# Patient Record
Sex: FEMALE | Race: WHITE | NOT HISPANIC OR LATINO | Employment: OTHER | RURAL
[De-identification: names, ages, dates, MRNs, and addresses within clinical notes are randomized per-mention and may not be internally consistent; named-entity substitution may affect disease eponyms.]

---

## 2021-10-13 ENCOUNTER — CLINICAL SUPPORT (OUTPATIENT)
Dept: FAMILY MEDICINE | Facility: CLINIC | Age: 66
End: 2021-10-13
Payer: MEDICARE

## 2021-10-13 DIAGNOSIS — Z23 IMMUNIZATION DUE: Primary | ICD-10-CM

## 2021-10-13 PROCEDURE — G0008 ADMIN INFLUENZA VIRUS VAC: HCPCS | Mod: ,,, | Performed by: NURSE PRACTITIONER

## 2021-10-13 PROCEDURE — 90662 FLU VACCINE - QUADRIVALENT - HIGH DOSE (65+) PRESERVATIVE FREE IM: ICD-10-PCS | Mod: ,,, | Performed by: NURSE PRACTITIONER

## 2021-10-13 PROCEDURE — 90662 IIV NO PRSV INCREASED AG IM: CPT | Mod: ,,, | Performed by: NURSE PRACTITIONER

## 2021-10-13 PROCEDURE — G0008 FLU VACCINE - QUADRIVALENT - HIGH DOSE (65+) PRESERVATIVE FREE IM: ICD-10-PCS | Mod: ,,, | Performed by: NURSE PRACTITIONER

## 2023-01-31 ENCOUNTER — OFFICE VISIT (OUTPATIENT)
Dept: FAMILY MEDICINE | Facility: CLINIC | Age: 68
End: 2023-01-31
Payer: MEDICARE

## 2023-01-31 VITALS — HEART RATE: 65 BPM | SYSTOLIC BLOOD PRESSURE: 168 MMHG | DIASTOLIC BLOOD PRESSURE: 76 MMHG

## 2023-01-31 DIAGNOSIS — I10 PRIMARY HYPERTENSION: Primary | Chronic | ICD-10-CM

## 2023-01-31 DIAGNOSIS — E78.2 MIXED HYPERLIPIDEMIA: Chronic | ICD-10-CM

## 2023-01-31 PROCEDURE — 99204 PR OFFICE/OUTPT VISIT, NEW, LEVL IV, 45-59 MIN: ICD-10-PCS | Mod: ,,, | Performed by: FAMILY MEDICINE

## 2023-01-31 PROCEDURE — 99204 OFFICE O/P NEW MOD 45 MIN: CPT | Mod: ,,, | Performed by: FAMILY MEDICINE

## 2023-01-31 RX ORDER — ATENOLOL 100 MG/1
100 TABLET ORAL
COMMUNITY
Start: 2023-01-02 | End: 2023-01-31 | Stop reason: SDUPTHER

## 2023-01-31 RX ORDER — SIMVASTATIN 80 MG/1
80 TABLET, FILM COATED ORAL NIGHTLY
Qty: 30 TABLET | Refills: 2 | Status: SHIPPED | OUTPATIENT
Start: 2023-01-31 | End: 2023-04-21 | Stop reason: SDUPTHER

## 2023-01-31 RX ORDER — LISINOPRIL 10 MG/1
10 TABLET ORAL DAILY
Qty: 30 TABLET | Refills: 2 | Status: SHIPPED | OUTPATIENT
Start: 2023-01-31 | End: 2023-02-28 | Stop reason: SDUPTHER

## 2023-01-31 RX ORDER — ATENOLOL 100 MG/1
100 TABLET ORAL DAILY
Qty: 30 TABLET | Refills: 2 | Status: SHIPPED | OUTPATIENT
Start: 2023-01-31 | End: 2023-02-28 | Stop reason: SDUPTHER

## 2023-01-31 RX ORDER — LISINOPRIL 10 MG/1
10 TABLET ORAL
COMMUNITY
Start: 2023-01-02 | End: 2023-01-31 | Stop reason: SDUPTHER

## 2023-01-31 RX ORDER — SIMVASTATIN 80 MG/1
80 TABLET, FILM COATED ORAL
COMMUNITY
Start: 2023-01-02 | End: 2023-01-31 | Stop reason: SDUPTHER

## 2023-01-31 NOTE — PROGRESS NOTES
Christopher Kelly MD   [facility]  MEDICAL GROUP [facility] - FAMILY MEDICINE  [address] [place] [id number]  [phone]      PATIENT NAME: Kathy Mansfield  : 1955  DATE: 23  MRN: [id number]      Billing Provider: Christopher Kelly MD  Level of Service:   Patient PCP Information       Provider PCP Type    Christopher Kelly MD General            Reason for Visit / Chief Complaint: Establish Care       Update PCP  Update Chief Complaint         History of Present Illness / Problem Focused Workflow     Kathy Mansfield presents to the clinic with Establish Care       68 yo WF here for follow up HTN and hyperlipidemia.  Says that she is doing well and has no c/o ROS today.  She does need meds refilled.  Was seeing Dr. Medel for management of blood pressure and lipids but he has recently retired.  She does moniotr BP at home and says it has been 130s/60s.  Reports that she has whitecoat syndrome.    Review of Systems     Review of Systems   Constitutional:  Negative for activity change, chills and fever.   HENT:  Negative for sore throat.    Eyes:  Negative for pain.   Respiratory:  Negative for cough, chest tightness and shortness of breath.    Cardiovascular:  Negative for chest pain and palpitations.   Gastrointestinal:  Negative for abdominal pain.   Neurological:  Negative for dizziness, syncope and weakness.   Psychiatric/Behavioral:  Negative for confusion.       Medical / Social / Family History   History reviewed. No pertinent past medical history.    History reviewed. No pertinent surgical history.    Social History            Family History  History reviewed. No pertinent family history.    Medications and Allergies     Medications  Outpatient Medications Marked as Taking for the 23 encounter (Office Visit) with Christopher Kelly MD   Medication Sig Dispense Refill    [DISCONTINUED] atenoloL (TENORMIN) 100 MG tablet Take 100 mg by mouth.       [DISCONTINUED] lisinopriL 10 MG tablet Take 10 mg by mouth.      [DISCONTINUED] simvastatin (ZOCOR) 80 MG tablet Take 80 mg by mouth.         Allergies  Review of patient's allergies indicates:  No Known Allergies    Physical Examination     Vitals:    01/31/23 1115   BP: (!) 168/76   Pulse: 65     Physical Exam  Vitals reviewed.   Constitutional:       Appearance: Normal appearance. She is normal weight.   HENT:      Head: Normocephalic and atraumatic.   Eyes:      Extraocular Movements: Extraocular movements intact.      Conjunctiva/sclera: Conjunctivae normal.      Pupils: Pupils are equal, round, and reactive to light.   Cardiovascular:      Rate and Rhythm: Normal rate and regular rhythm.      Heart sounds: Normal heart sounds.   Pulmonary:      Effort: Pulmonary effort is normal.      Breath sounds: Normal breath sounds.   Musculoskeletal:         General: Normal range of motion.      Cervical back: Normal range of motion.   Skin:     General: Skin is warm and dry.   Neurological:      General: No focal deficit present.      Mental Status: She is alert and oriented to person, place, and time.   Psychiatric:         Mood and Affect: Mood normal.         Behavior: Behavior normal.        Assessment and Plan (including Health Maintenance)      Problem List  Smart Sets  Document Outside HM   :    Plan: she is not due for labs until late March.  She will moniotr BP at home and has virtual nursing visit and MD clinic visits scheduled.          Health Maintenance Due   Topic Date Due    Hepatitis C Screening  Never done    Lipid Panel  Never done    COVID-19 Vaccine (1) Never done    TETANUS VACCINE  Never done    Mammogram  Never done    DEXA Scan  Never done    Colorectal Cancer Screening  Never done    Shingles Vaccine (1 of 2) Never done    Pneumococcal Vaccines (Age 65+) (1 - PCV) Never done    Influenza Vaccine (1) 09/01/2022       Problem List Items Addressed This Visit    None  Visit Diagnoses       Primary  hypertension    -  Primary    Relevant Medications    atenoloL (TENORMIN) 100 MG tablet    lisinopriL 10 MG tablet    Mixed hyperlipidemia        Relevant Medications    simvastatin (ZOCOR) 80 MG tablet            The patient has no Health Maintenance topics of status Not Due    Future Appointments   Date Time Provider Department Center   2/13/2023 11:00 AM [address] [facility] FAMILY MED[facility]   2/28/2023  9:15 AM Christopher Kelly MD Located within [facility]            Signature:  MD GHAZAL Robb [facility]  MEDICAL GROUP [facility] FAMILY MEDICINE  [address] [place] [address] [place] [address]  [phone]    Date of encounter: 1/31/23

## 2023-02-13 ENCOUNTER — CLINICAL SUPPORT (OUTPATIENT)
Dept: FAMILY MEDICINE | Facility: CLINIC | Age: 68
End: 2023-02-13
Payer: MEDICARE

## 2023-02-13 VITALS — DIASTOLIC BLOOD PRESSURE: 62 MMHG | SYSTOLIC BLOOD PRESSURE: 139 MMHG

## 2023-02-13 DIAGNOSIS — I10 PRIMARY HYPERTENSION: Primary | ICD-10-CM

## 2023-02-28 ENCOUNTER — OFFICE VISIT (OUTPATIENT)
Dept: FAMILY MEDICINE | Facility: CLINIC | Age: 68
End: 2023-02-28
Payer: MEDICARE

## 2023-02-28 VITALS
HEART RATE: 66 BPM | SYSTOLIC BLOOD PRESSURE: 167 MMHG | BODY MASS INDEX: 26.46 KG/M2 | WEIGHT: 155 LBS | HEIGHT: 64 IN | DIASTOLIC BLOOD PRESSURE: 76 MMHG

## 2023-02-28 DIAGNOSIS — I10 PRIMARY HYPERTENSION: Primary | Chronic | ICD-10-CM

## 2023-02-28 DIAGNOSIS — E78.2 MIXED HYPERLIPIDEMIA: Chronic | ICD-10-CM

## 2023-02-28 PROCEDURE — 99214 OFFICE O/P EST MOD 30 MIN: CPT | Mod: ,,, | Performed by: FAMILY MEDICINE

## 2023-02-28 PROCEDURE — 99214 PR OFFICE/OUTPT VISIT, EST, LEVL IV, 30-39 MIN: ICD-10-PCS | Mod: ,,, | Performed by: FAMILY MEDICINE

## 2023-02-28 RX ORDER — ATENOLOL 100 MG/1
100 TABLET ORAL DAILY
Qty: 30 TABLET | Refills: 1
Start: 2023-02-28 | End: 2023-04-21 | Stop reason: SDUPTHER

## 2023-02-28 RX ORDER — ASPIRIN 325 MG
325 TABLET ORAL DAILY
COMMUNITY

## 2023-02-28 RX ORDER — LISINOPRIL 10 MG/1
10 TABLET ORAL DAILY
Qty: 30 TABLET | Refills: 1
Start: 2023-02-28 | End: 2023-04-21 | Stop reason: SDUPTHER

## 2023-02-28 NOTE — PROGRESS NOTES
"   Christopher Kelly MD   [facility]  MEDICAL GROUP [facility] FAMILY MEDICINE  [address] [place] [address]  [phone]      PATIENT NAME: Kathy Mansfield  : 1955  DATE: 23  MRN: [id number]      Billing Provider: Christopher Kelly MD  Level of Service:   Patient PCP Information       Provider PCP Type    Christopher Kelly MD General            Reason for Visit / Chief Complaint: Follow-up (1 Month Follow-up HTN)       Update PCP  Update Chief Complaint         History of Present Illness / Problem Focused Workflow     Kathy Mansfield presents to the clinic with Follow-up (1 Month Follow-up HTN)       66 yo WF here for one month follow up HTN.  She is compliant with meds and monitors at home and is normotensive.  Is frequently elevated in office.  Pt reports having "white coat syndrome."  No new/acute complaints.  Also on med for lipids.  Needs labs done next visit.    Review of Systems     Review of Systems   Constitutional:  Negative for activity change, chills and fever.   HENT:  Negative for sore throat.    Eyes:  Negative for pain.   Respiratory:  Negative for cough, chest tightness and shortness of breath.    Cardiovascular:  Negative for chest pain and palpitations.   Gastrointestinal:  Negative for abdominal pain.   Neurological:  Negative for dizziness, syncope and weakness.   Psychiatric/Behavioral:  Negative for confusion.       Medical / Social / Family History   History reviewed. No pertinent past medical history.    History reviewed. No pertinent surgical history.    Social History    reports that she has never smoked. She has never been exposed to tobacco smoke. She has never used smokeless tobacco.   Social History     Tobacco Use    Smoking status: Never     Passive exposure: Never    Smokeless tobacco: Never       Family History  History reviewed. No pertinent family history.    Medications and Allergies     Medications  Outpatient Medications " Marked as Taking for the 2/28/23 encounter (Office Visit) with Christopher Kelly MD   Medication Sig Dispense Refill    aspirin 325 MG tablet Take 325 mg by mouth once daily.      simvastatin (ZOCOR) 80 MG tablet Take 1 tablet (80 mg total) by mouth nightly. 30 tablet 2    [DISCONTINUED] atenoloL (TENORMIN) 100 MG tablet Take 1 tablet (100 mg total) by mouth once daily. 30 tablet 2    [DISCONTINUED] lisinopriL 10 MG tablet Take 1 tablet (10 mg total) by mouth once daily. 30 tablet 2       Allergies  Review of patient's allergies indicates:  No Known Allergies    Physical Examination     Vitals:    02/28/23 0916   BP: (!) 167/76   Pulse: 66     Physical Exam  Vitals reviewed.   Constitutional:       Appearance: Normal appearance.   HENT:      Head: Normocephalic and atraumatic.   Eyes:      Extraocular Movements: Extraocular movements intact.      Conjunctiva/sclera: Conjunctivae normal.      Pupils: Pupils are equal, round, and reactive to light.   Cardiovascular:      Rate and Rhythm: Normal rate and regular rhythm.      Heart sounds: Normal heart sounds.   Pulmonary:      Effort: Pulmonary effort is normal.      Breath sounds: Normal breath sounds.   Musculoskeletal:         General: Normal range of motion.      Cervical back: Normal range of motion.   Skin:     General: Skin is warm and dry.   Neurological:      General: No focal deficit present.      Mental Status: She is alert and oriented to person, place, and time.   Psychiatric:         Mood and Affect: Mood normal.         Behavior: Behavior normal.        Assessment and Plan (including Health Maintenance)      Problem List  Smart Sets  Document Outside HM   :    Plan: labs at next visit- CBC, CMP and lipid panel        Health Maintenance Due   Topic Date Due    Hepatitis C Screening  Never done    Lipid Panel  Never done    COVID-19 Vaccine (1) Never done    TETANUS VACCINE  Never done    Mammogram  Never done    Hemoglobin A1c (Diabetic Prevention  Screening)  Never done    DEXA Scan  Never done    Colorectal Cancer Screening  Never done    Shingles Vaccine (1 of 2) Never done    Pneumococcal Vaccines (Age 65+) (1 - PCV) Never done    Influenza Vaccine (1) 09/01/2022       Problem List Items Addressed This Visit    None  Visit Diagnoses       Primary hypertension  (Chronic)   -  Primary    Relevant Medications    lisinopriL 10 MG tablet    atenoloL (TENORMIN) 100 MG tablet    Mixed hyperlipidemia  (Chronic)               The patient has no Health Maintenance topics of status Not Due    No future appointments.         Signature:  MD GHAZAL Robb [facility]  MEDICAL GROUP [facility] - FAMILY MEDICINE  [address] [place] [address] [place] [address]  [phone]    Date of encounter: 2/28/23

## 2023-04-21 DIAGNOSIS — E78.2 MIXED HYPERLIPIDEMIA: Chronic | ICD-10-CM

## 2023-04-21 DIAGNOSIS — I10 PRIMARY HYPERTENSION: Chronic | ICD-10-CM

## 2023-04-21 RX ORDER — ATENOLOL 100 MG/1
100 TABLET ORAL DAILY
Qty: 30 TABLET | Refills: 2 | Status: SHIPPED | OUTPATIENT
Start: 2023-04-21 | End: 2023-08-23 | Stop reason: SDUPTHER

## 2023-04-21 RX ORDER — LISINOPRIL 10 MG/1
10 TABLET ORAL DAILY
Qty: 30 TABLET | Refills: 2 | Status: SHIPPED | OUTPATIENT
Start: 2023-04-21 | End: 2023-08-23 | Stop reason: SDUPTHER

## 2023-04-21 RX ORDER — SIMVASTATIN 80 MG/1
80 TABLET, FILM COATED ORAL NIGHTLY
Qty: 30 TABLET | Refills: 2 | Status: SHIPPED | OUTPATIENT
Start: 2023-04-21 | End: 2023-08-23 | Stop reason: SDUPTHER

## 2023-08-23 ENCOUNTER — OFFICE VISIT (OUTPATIENT)
Dept: FAMILY MEDICINE | Facility: CLINIC | Age: 68
End: 2023-08-23
Payer: MEDICARE

## 2023-08-23 VITALS
WEIGHT: 164.31 LBS | DIASTOLIC BLOOD PRESSURE: 78 MMHG | BODY MASS INDEX: 28.05 KG/M2 | HEART RATE: 80 BPM | SYSTOLIC BLOOD PRESSURE: 138 MMHG | HEIGHT: 64 IN

## 2023-08-23 DIAGNOSIS — I10 PRIMARY HYPERTENSION: Chronic | ICD-10-CM

## 2023-08-23 DIAGNOSIS — Z11.59 NEED FOR HEPATITIS C SCREENING TEST: Primary | ICD-10-CM

## 2023-08-23 DIAGNOSIS — Z13.1 SCREENING FOR DIABETES MELLITUS: ICD-10-CM

## 2023-08-23 DIAGNOSIS — E78.2 MIXED HYPERLIPIDEMIA: Chronic | ICD-10-CM

## 2023-08-23 LAB
CHOLEST SERPL-MCNC: 195 MG/DL (ref 0–200)
CHOLEST/HDLC SERPL: 4.3 {RATIO}
HCV AB SER QL: NORMAL
HDLC SERPL-MCNC: 45 MG/DL (ref 40–60)
LDLC SERPL CALC-MCNC: 103 MG/DL
LDLC/HDLC SERPL: 2.3 {RATIO}
NONHDLC SERPL-MCNC: 150 MG/DL
TRIGL SERPL-MCNC: 234 MG/DL (ref 35–150)
VLDLC SERPL-MCNC: 47 MG/DL

## 2023-08-23 PROCEDURE — 80061 LIPID PANEL: ICD-10-PCS | Mod: ,,, | Performed by: CLINICAL MEDICAL LABORATORY

## 2023-08-23 PROCEDURE — 99214 OFFICE O/P EST MOD 30 MIN: CPT | Mod: ,,, | Performed by: FAMILY MEDICINE

## 2023-08-23 PROCEDURE — 86803 HEPATITIS C AB TEST: CPT | Mod: ,,, | Performed by: CLINICAL MEDICAL LABORATORY

## 2023-08-23 PROCEDURE — 99214 PR OFFICE/OUTPT VISIT, EST, LEVL IV, 30-39 MIN: ICD-10-PCS | Mod: ,,, | Performed by: FAMILY MEDICINE

## 2023-08-23 PROCEDURE — 86803 HEPATITIS C ANTIBODY: ICD-10-PCS | Mod: ,,, | Performed by: CLINICAL MEDICAL LABORATORY

## 2023-08-23 PROCEDURE — 80061 LIPID PANEL: CPT | Mod: ,,, | Performed by: CLINICAL MEDICAL LABORATORY

## 2023-08-23 RX ORDER — ATENOLOL 100 MG/1
100 TABLET ORAL DAILY
Qty: 30 TABLET | Refills: 2 | Status: SHIPPED | OUTPATIENT
Start: 2023-08-23 | End: 2023-12-21 | Stop reason: SDUPTHER

## 2023-08-23 RX ORDER — SIMVASTATIN 80 MG/1
80 TABLET, FILM COATED ORAL NIGHTLY
Qty: 30 TABLET | Refills: 2 | Status: SHIPPED | OUTPATIENT
Start: 2023-08-23 | End: 2024-01-18 | Stop reason: SDUPTHER

## 2023-08-23 RX ORDER — LISINOPRIL 10 MG/1
10 TABLET ORAL DAILY
Qty: 30 TABLET | Refills: 2 | Status: SHIPPED | OUTPATIENT
Start: 2023-08-23 | End: 2024-01-18 | Stop reason: SDUPTHER

## 2023-08-23 NOTE — PROGRESS NOTES
Christopher Kelly MD   [facility]  MEDICAL GROUP [facility] - FAMILY MEDICINE  [address] [place] [address]  [phone]      PATIENT NAME: Kathy Mansfield  : 1955  DATE: 23  MRN: [id number]      Billing Provider: Christopher Kelly MD  Level of Service:   Patient PCP Information       Provider PCP Type    Christopher Kelly MD General            Reason for Visit / Chief Complaint: Medication Refill       Update PCP  Update Chief Complaint         History of Present Illness / Problem Focused Workflow     Kathy Mansfield presents to the clinic with Medication Refill       Follow up HTN and hyperlipidemia.  She says that she is doing well and has no complaints on ROS.  We discussed screening mammogram and colonoscopy.  She is adamant that she does not want to get either done.  We discussed the importance of screening for disease in the early stages but she declines.  She did get some labs done today.  Discussed doing self breast exams and warning signs of colorectal cancer.      Medication Refill  Pertinent negatives include no abdominal pain, change in bowel habit, chest pain, chills, coughing, fever, sore throat or weakness.       Review of Systems     Review of Systems   Constitutional:  Negative for activity change, chills and fever.   HENT:  Negative for sore throat.    Eyes:  Negative for pain.   Respiratory:  Negative for cough, chest tightness and shortness of breath.    Cardiovascular:  Negative for chest pain and palpitations.   Gastrointestinal:  Negative for abdominal pain, anal bleeding, blood in stool, change in bowel habit, constipation, diarrhea and change in bowel habit.   Integumentary:  Negative for breast mass, breast discharge and breast tenderness.   Neurological:  Negative for dizziness, syncope and weakness.   Psychiatric/Behavioral:  Negative for confusion.    Breast: Negative for mass and tenderness       Medical / Social / Family History    History reviewed. No pertinent past medical history.    History reviewed. No pertinent surgical history.    Social History    reports that she has never smoked. She has never been exposed to tobacco smoke. She has never used smokeless tobacco.   Social History     Tobacco Use    Smoking status: Never     Passive exposure: Never    Smokeless tobacco: Never       Family History  History reviewed. No pertinent family history.    Medications and Allergies     Medications  Outpatient Medications Marked as Taking for the 8/23/23 encounter (Office Visit) with Christopher Kelly MD   Medication Sig Dispense Refill    aspirin 325 MG tablet Take 325 mg by mouth once daily.      [DISCONTINUED] atenoloL (TENORMIN) 100 MG tablet Take 1 tablet (100 mg total) by mouth once daily. 30 tablet 2    [DISCONTINUED] lisinopriL 10 MG tablet Take 1 tablet (10 mg total) by mouth once daily. 30 tablet 2    [DISCONTINUED] simvastatin (ZOCOR) 80 MG tablet Take 1 tablet (80 mg total) by mouth nightly. 30 tablet 2       Allergies  Review of patient's allergies indicates:  No Known Allergies    Physical Examination     Vitals:    08/23/23 0922   BP: 138/78   Pulse: 80     Physical Exam  Vitals reviewed.   Constitutional:       Appearance: Normal appearance.   HENT:      Head: Normocephalic and atraumatic.   Eyes:      Extraocular Movements: Extraocular movements intact.      Conjunctiva/sclera: Conjunctivae normal.      Pupils: Pupils are equal, round, and reactive to light.   Cardiovascular:      Rate and Rhythm: Normal rate and regular rhythm.      Heart sounds: Normal heart sounds.   Pulmonary:      Effort: Pulmonary effort is normal.      Breath sounds: Normal breath sounds.   Musculoskeletal:         General: Normal range of motion.      Cervical back: Normal range of motion.   Skin:     General: Skin is warm and dry.   Neurological:      General: No focal deficit present.      Mental Status: She is alert and oriented to person, place, and  time.   Psychiatric:         Mood and Affect: Mood normal.         Behavior: Behavior normal.          Assessment and Plan (including Health Maintenance)      Problem List  Smart Sets  Document Outside HM   :    Plan: PATIENT DOES NOT WANT TO ADDRESS ANY OTHER CARE GAPS AT THIS TIME.  Screening A1C not covered by insurance plan.          Health Maintenance Due   Topic Date Due    Hepatitis C Screening  Never done    Lipid Panel  Never done    COVID-19 Vaccine (1) Never done    TETANUS VACCINE  Never done    Mammogram  Never done    Hemoglobin A1c (Diabetic Prevention Screening)  Never done    DEXA Scan  Never done    Colorectal Cancer Screening  Never done    Shingles Vaccine (1 of 2) Never done    Pneumococcal Vaccines (Age 65+) (1 - PCV) Never done       Problem List Items Addressed This Visit    None  Visit Diagnoses       Need for hepatitis C screening test    -  Primary    Relevant Orders    Hepatitis C Antibody    Primary hypertension  (Chronic)       Relevant Medications    atenoloL (TENORMIN) 100 MG tablet    lisinopriL 10 MG tablet    Mixed hyperlipidemia  (Chronic)       Relevant Medications    simvastatin (ZOCOR) 80 MG tablet    Other Relevant Orders    Lipid Panel    Screening for diabetes mellitus        Relevant Orders    Hemoglobin A1C            Health Maintenance Topics with due status: Not Due       Topic Last Completion Date    Influenza Vaccine 10/13/2021       No future appointments.         Signature:  MD GHAZAL Robb [facility]  MEDICAL GROUP [facility] - FAMILY MEDICINE  [address] [place] [address]  [phone]    Date of encounter: 8/23/23

## 2023-11-06 ENCOUNTER — CLINICAL SUPPORT (OUTPATIENT)
Dept: FAMILY MEDICINE | Facility: CLINIC | Age: 68
End: 2023-11-06
Payer: MEDICARE

## 2023-11-06 DIAGNOSIS — Z23 IMMUNIZATION DUE: Primary | ICD-10-CM

## 2023-11-06 PROCEDURE — G0008 FLU VACCINE - QUADRIVALENT - ADJUVANTED: ICD-10-PCS | Mod: ,,, | Performed by: NURSE PRACTITIONER

## 2023-11-06 PROCEDURE — 90694 VACC AIIV4 NO PRSRV 0.5ML IM: CPT | Mod: ,,, | Performed by: NURSE PRACTITIONER

## 2023-11-06 PROCEDURE — 90694 FLU VACCINE - QUADRIVALENT - ADJUVANTED: ICD-10-PCS | Mod: ,,, | Performed by: NURSE PRACTITIONER

## 2023-11-06 PROCEDURE — G0008 ADMIN INFLUENZA VIRUS VAC: HCPCS | Mod: ,,, | Performed by: NURSE PRACTITIONER

## 2023-12-21 DIAGNOSIS — I10 PRIMARY HYPERTENSION: Chronic | ICD-10-CM

## 2023-12-21 RX ORDER — ATENOLOL 100 MG/1
100 TABLET ORAL DAILY
Qty: 30 TABLET | Refills: 0 | Status: SHIPPED | OUTPATIENT
Start: 2023-12-21 | End: 2024-01-18 | Stop reason: SDUPTHER

## 2024-01-19 ENCOUNTER — OFFICE VISIT (OUTPATIENT)
Dept: FAMILY MEDICINE | Facility: CLINIC | Age: 69
End: 2024-01-19
Payer: MEDICARE

## 2024-01-19 VITALS
BODY MASS INDEX: 27.95 KG/M2 | WEIGHT: 163.69 LBS | DIASTOLIC BLOOD PRESSURE: 72 MMHG | HEART RATE: 67 BPM | HEIGHT: 64 IN | SYSTOLIC BLOOD PRESSURE: 148 MMHG

## 2024-01-19 DIAGNOSIS — E78.2 MIXED HYPERLIPIDEMIA: Chronic | ICD-10-CM

## 2024-01-19 DIAGNOSIS — I10 PRIMARY HYPERTENSION: Primary | Chronic | ICD-10-CM

## 2024-01-19 PROCEDURE — 99214 OFFICE O/P EST MOD 30 MIN: CPT | Mod: ,,, | Performed by: FAMILY MEDICINE

## 2024-01-19 RX ORDER — LISINOPRIL 10 MG/1
10 TABLET ORAL DAILY
Qty: 30 TABLET | Refills: 2 | Status: SHIPPED | OUTPATIENT
Start: 2024-01-19 | End: 2024-04-17 | Stop reason: SDUPTHER

## 2024-01-19 RX ORDER — ATENOLOL 100 MG/1
100 TABLET ORAL DAILY
Qty: 30 TABLET | Refills: 2 | Status: SHIPPED | OUTPATIENT
Start: 2024-01-19 | End: 2024-04-17 | Stop reason: SDUPTHER

## 2024-01-19 RX ORDER — SIMVASTATIN 80 MG/1
80 TABLET, FILM COATED ORAL NIGHTLY
Qty: 30 TABLET | Refills: 2 | Status: SHIPPED | OUTPATIENT
Start: 2024-01-19 | End: 2024-04-17 | Stop reason: SDUPTHER

## 2024-01-19 NOTE — PROGRESS NOTES
"   Christopher Kelly MD   [facility]  MEDICAL GROUP [facility] FAMILY MEDICINE  [address] [place] [id number]  [phone]      PATIENT NAME: Kahty Mansfield  : 1955  DATE: 24  MRN: [id number]      Billing Provider: Christopher Kelly MD  Level of Service:   Patient PCP Information       Provider PCP Type    Christopher Kelly MD General            Reason for Visit / Chief Complaint: Medication Refill       Update PCP  Update Chief Complaint         History of Present Illness / Problem Focused Workflow     Kathy Mansfield presents to the clinic with Medication Refill       Follow up HTN and hyperlipidemia.  She is due for several preventive screenings but says that she does not want done under any circumstances.  She denies any complaints on ROS.  Here fo med refills only.  She monitors BP at home and says that it runs 120-130s/60s.  She says that she has "white coat syndrome."        Review of Systems     Review of Systems   Constitutional:  Negative for activity change, chills and fever.   HENT:  Negative for sore throat.    Eyes:  Negative for pain.   Respiratory:  Negative for cough, chest tightness and shortness of breath.    Cardiovascular:  Negative for chest pain and palpitations.   Gastrointestinal:  Negative for abdominal pain.   Neurological:  Negative for dizziness, syncope and weakness.   Psychiatric/Behavioral:  Negative for confusion.         Medical / Social / Family History   History reviewed. No pertinent past medical history.    History reviewed. No pertinent surgical history.    Social History    reports that she has never smoked. She has never been exposed to tobacco smoke. She has never used smokeless tobacco.   Social History     Tobacco Use    Smoking status: Never     Passive exposure: Never    Smokeless tobacco: Never       Family History  History reviewed. No pertinent family history.    Medications and Allergies     Medications  No " outpatient medications have been marked as taking for the 1/19/24 encounter (Office Visit) with Christopher Kelly MD.       Allergies  Review of patient's allergies indicates:  No Known Allergies    Physical Examination     Vitals:    01/19/24 0908   BP: (!) 148/72   Pulse: 67     Physical Exam  Vitals reviewed.   Constitutional:       Appearance: Normal appearance.   HENT:      Head: Normocephalic and atraumatic.   Eyes:      Extraocular Movements: Extraocular movements intact.      Conjunctiva/sclera: Conjunctivae normal.      Pupils: Pupils are equal, round, and reactive to light.   Cardiovascular:      Rate and Rhythm: Normal rate and regular rhythm.      Heart sounds: Normal heart sounds.   Pulmonary:      Effort: Pulmonary effort is normal.      Breath sounds: Normal breath sounds.   Musculoskeletal:         General: Normal range of motion.      Cervical back: Normal range of motion.   Skin:     General: Skin is warm and dry.   Neurological:      General: No focal deficit present.      Mental Status: She is alert and oriented to person, place, and time.   Psychiatric:         Mood and Affect: Mood normal.         Behavior: Behavior normal.          Assessment and Plan (including Health Maintenance)      Problem List  Smart Sets  Document Outside HM   :    Plan: PATIENT DOES NOT WANT TO ADDRESS ANY OTHER CARE GAPS AT THIS TIME  Will call next week to check on home BP readings        Health Maintenance Due   Topic Date Due    COVID-19 Vaccine (1) Never done    TETANUS VACCINE  Never done    DEXA Scan  Never done    Shingles Vaccine (1 of 2) Never done    RSV Vaccine (Age 60+ and Pregnant patients) (1 - 1-dose 60+ series) Never done    Pneumococcal Vaccines (Age 65+) (1 - PCV) Never done       Problem List Items Addressed This Visit    None  Visit Diagnoses       Primary hypertension  (Chronic)   -  Primary    Relevant Medications    atenoloL (TENORMIN) 100 MG tablet    lisinopriL 10 MG tablet    Mixed  hyperlipidemia  (Chronic)       Relevant Medications    simvastatin (ZOCOR) 80 MG tablet            Health Maintenance Topics with due status: Not Due       Topic Last Completion Date    Lipid Panel 08/23/2023       Future Appointments   Date Time Provider Department Center   4/17/2024  9:00 AM Christopher Kelly MD [facility]            Signature:  MD GHAZAL Robb [facility]  MEDICAL GROUP [facility] FAMILY MEDICINE  [address] [place] [address]  [phone]    Date of encounter: 1/19/24

## 2024-04-15 DIAGNOSIS — I10 PRIMARY HYPERTENSION: Chronic | ICD-10-CM

## 2024-04-15 DIAGNOSIS — E78.2 MIXED HYPERLIPIDEMIA: Chronic | ICD-10-CM

## 2024-04-15 RX ORDER — SIMVASTATIN 80 MG/1
80 TABLET, FILM COATED ORAL NIGHTLY
Qty: 30 TABLET | Refills: 2 | Status: CANCELLED | OUTPATIENT
Start: 2024-04-15

## 2024-04-15 RX ORDER — LISINOPRIL 10 MG/1
10 TABLET ORAL DAILY
Qty: 30 TABLET | Refills: 2 | Status: CANCELLED | OUTPATIENT
Start: 2024-04-15

## 2024-04-15 RX ORDER — ATENOLOL 100 MG/1
100 TABLET ORAL DAILY
Qty: 30 TABLET | Refills: 2 | Status: CANCELLED | OUTPATIENT
Start: 2024-04-15

## 2024-04-17 ENCOUNTER — OFFICE VISIT (OUTPATIENT)
Dept: FAMILY MEDICINE | Facility: CLINIC | Age: 69
End: 2024-04-17
Payer: MEDICARE

## 2024-04-17 ENCOUNTER — TELEPHONE (OUTPATIENT)
Dept: FAMILY MEDICINE | Facility: CLINIC | Age: 69
End: 2024-04-17
Payer: MEDICARE

## 2024-04-17 VITALS — DIASTOLIC BLOOD PRESSURE: 63 MMHG | SYSTOLIC BLOOD PRESSURE: 123 MMHG

## 2024-04-17 DIAGNOSIS — I10 PRIMARY HYPERTENSION: Primary | Chronic | ICD-10-CM

## 2024-04-17 DIAGNOSIS — E78.2 MIXED HYPERLIPIDEMIA: Chronic | ICD-10-CM

## 2024-04-17 PROCEDURE — 99441 PR PHYSICIAN TELEPHONE EVALUATION 5-10 MIN: CPT | Mod: 95,,, | Performed by: FAMILY MEDICINE

## 2024-04-17 RX ORDER — LISINOPRIL 10 MG/1
10 TABLET ORAL DAILY
Qty: 90 TABLET | Refills: 1 | Status: SHIPPED | OUTPATIENT
Start: 2024-04-17

## 2024-04-17 RX ORDER — ATENOLOL 100 MG/1
100 TABLET ORAL DAILY
Qty: 90 TABLET | Refills: 1 | Status: SHIPPED | OUTPATIENT
Start: 2024-04-17

## 2024-04-17 RX ORDER — SIMVASTATIN 80 MG/1
80 TABLET, FILM COATED ORAL NIGHTLY
Qty: 90 TABLET | Refills: 3 | Status: SHIPPED | OUTPATIENT
Start: 2024-04-17

## 2024-04-17 NOTE — PROGRESS NOTES
Established Patient - Audio Only Telehealth Visit     The patient location is: home  The chief complaint leading to consultation is: HTN  Visit type: Virtual visit with audio only (telephone)  Total time spent with patient: 8 minutes        The reason for the audio only service rather than synchronous audio and video virtual visit was related to technical difficulties or patient preference/necessity.     Each patient to whom I provide medical services by telemedicine is:  (1) informed of the relationship between the physician and patient and the respective role of any other health care provider with respect to management of the patient; and (2) notified that they may decline to receive medical services by telemedicine and may withdraw from such care at any time. Patient verbally consented to receive this service via voice-only telephone call.        HPI: 69 yo WF who is treated for HTN and hyperlipidemia.  She needs her medications refilled.  She monitors BP at home on a regular basis and reports that she is normotensive in the 120s/60-70s.  Her most recent BP this morning was 124/66 and was 123/63 yesterday.  She denies having any complaints.    Assessment and plan:   I am going to refill her medications as requested and she will follow up in 6 months in clinic and get klabs done at that time.                                  This service was not originating from a related E/M service provided within the previous 7 days nor will  to an E/M service or procedure within the next 24 hours or my soonest available appointment.  Prevailing standard of care was able to be met in this audio-only visit.

## 2024-05-09 DIAGNOSIS — Z71.89 COMPLEX CARE COORDINATION: ICD-10-CM

## 2024-05-23 ENCOUNTER — OFFICE VISIT (OUTPATIENT)
Dept: FAMILY MEDICINE | Facility: CLINIC | Age: 69
End: 2024-05-23
Payer: MEDICARE

## 2024-05-23 VITALS
HEIGHT: 64 IN | SYSTOLIC BLOOD PRESSURE: 163 MMHG | WEIGHT: 161.31 LBS | DIASTOLIC BLOOD PRESSURE: 73 MMHG | BODY MASS INDEX: 27.54 KG/M2 | HEART RATE: 78 BPM

## 2024-05-23 DIAGNOSIS — N39.0 URINARY TRACT INFECTION WITH HEMATURIA, SITE UNSPECIFIED: Primary | ICD-10-CM

## 2024-05-23 DIAGNOSIS — I10 PRIMARY HYPERTENSION: Chronic | ICD-10-CM

## 2024-05-23 DIAGNOSIS — R31.9 HEMATURIA, UNSPECIFIED TYPE: ICD-10-CM

## 2024-05-23 DIAGNOSIS — R31.9 URINARY TRACT INFECTION WITH HEMATURIA, SITE UNSPECIFIED: Primary | ICD-10-CM

## 2024-05-23 DIAGNOSIS — E78.2 MIXED HYPERLIPIDEMIA: Chronic | ICD-10-CM

## 2024-05-23 LAB
BILIRUB SERPL-MCNC: NORMAL MG/DL
BLOOD URINE, POC: NORMAL
COLOR, POC UA: YELLOW
GLUCOSE UR QL STRIP: NORMAL
KETONES UR QL STRIP: NORMAL
LEUKOCYTE ESTERASE URINE, POC: NORMAL
NITRITE, POC UA: NORMAL
PH, POC UA: 6
PROTEIN, POC: NORMAL
SPECIFIC GRAVITY, POC UA: 1.01
UROBILINOGEN, POC UA: 0.2

## 2024-05-23 PROCEDURE — G2211 COMPLEX E/M VISIT ADD ON: HCPCS | Mod: ,,, | Performed by: FAMILY MEDICINE

## 2024-05-23 PROCEDURE — 99214 OFFICE O/P EST MOD 30 MIN: CPT | Mod: ,,, | Performed by: FAMILY MEDICINE

## 2024-05-23 PROCEDURE — 81003 URINALYSIS AUTO W/O SCOPE: CPT | Mod: RHCUB | Performed by: FAMILY MEDICINE

## 2024-05-23 RX ORDER — FLUCONAZOLE 150 MG/1
150 TABLET ORAL DAILY
Qty: 1 TABLET | Refills: 0 | Status: SHIPPED | OUTPATIENT
Start: 2024-05-23 | End: 2024-05-24

## 2024-05-23 RX ORDER — SULFAMETHOXAZOLE AND TRIMETHOPRIM 800; 160 MG/1; MG/1
1 TABLET ORAL 2 TIMES DAILY
Qty: 6 TABLET | Refills: 0 | Status: SHIPPED | OUTPATIENT
Start: 2024-05-23

## 2024-05-23 NOTE — PROGRESS NOTES
Christopher Kelly MD   [facility]  MEDICAL GROUP [address] [address]  [phone]      PATIENT NAME: Kathy Mansfield  : 1955  DATE: 24  MRN: [id number]      Billing Provider: Christopher Kelly MD  Level of Service:   Patient PCP Information       Provider PCP Type    Christopher Kelly MD General            Reason for Visit / Chief Complaint: Hematuria and Dysuria (Started yesterday)       Update PCP  Update Chief Complaint         History of Present Illness / Problem Focused Workflow     Kathy Mansfield presents to the clinic with Hematuria and Dysuria (Started yesterday)       Denies any fever, chills, nausea or vomiting.    BP elevated.  She monitors at home and is normotensive.  Reports that she is typically 120s/60-70s.        Review of Systems     Review of Systems   Constitutional:  Negative for chills and fever.   Gastrointestinal:  Negative for nausea and vomiting.   Genitourinary:  Positive for dysuria and hematuria.        Medical / Social / Family History   History reviewed. No pertinent past medical history.    History reviewed. No pertinent surgical history.    Social History    reports that she has never smoked. She has never been exposed to tobacco smoke. She has never used smokeless tobacco.   Social History     Tobacco Use    Smoking status: Never     Passive exposure: Never    Smokeless tobacco: Never       Family History  No family history on file.    Medications and Allergies     Medications  No outpatient medications have been marked as taking for the 24 encounter (Office Visit) with Christopher Kelly MD.       Allergies  Review of patient's allergies indicates:  No Known Allergies    Physical Examination     Vitals:    24 0918   BP: (!) 163/73   Pulse: 78     Physical Exam  Vitals reviewed.   Constitutional:       Appearance: Normal appearance.   HENT:      Head: Normocephalic and  atraumatic.   Eyes:      Extraocular Movements: Extraocular movements intact.      Conjunctiva/sclera: Conjunctivae normal.      Pupils: Pupils are equal, round, and reactive to light.   Cardiovascular:      Rate and Rhythm: Normal rate and regular rhythm.      Heart sounds: Normal heart sounds.   Pulmonary:      Effort: Pulmonary effort is normal.      Breath sounds: Normal breath sounds.   Musculoskeletal:         General: Normal range of motion.      Cervical back: Normal range of motion.   Skin:     General: Skin is warm and dry.   Neurological:      General: No focal deficit present.      Mental Status: She is alert and oriented to person, place, and time.   Psychiatric:         Mood and Affect: Mood normal.         Behavior: Behavior normal.          Office Visit on 05/23/2024   Component Date Value Ref Range Status    Color, UA 05/23/2024 Yellow   Final    Spec Grav UA 05/23/2024 1.010   Final    pH, UA 05/23/2024 6.0   Final    WBC, UA 05/23/2024 small   Final    Nitrite, UA 05/23/2024 neg   Final    Protein, POC 05/23/2024 neg   Final    Glucose, UA 05/23/2024 neg   Final    Ketones, UA 05/23/2024 neg   Final    Bilirubin, POC 05/23/2024 neg   Final    Urobilinogen, UA 05/23/2024 0.2   Final    Blood, UA 05/23/2024 small   Final         Assessment and Plan (including Health Maintenance)      Problem List  Smart Sets  Document Outside HM   :    Plan: hold aspirin for the next few days.  Increase PO water intake.  Take antibiotics as prescribed and may take probiotic or eat yogurt while on antibiotics.  This encounter included increased complexity inherent to the evaluation and management associated with medical care services that serve as the continuing focal point for all needed health care services and/or with medical care services that are part of ongoing care related to a patients single, serious condition or a complex condition.  Previous labs and encounters have been reviewed during the course of this  visit in order to make medical decisions related to ongoing care of the patient.         Health Maintenance Due   Topic Date Due    TETANUS VACCINE  Never done    DEXA Scan  Never done    Shingles Vaccine (1 of 2) Never done    RSV Vaccine (Age 60+ and Pregnant patients) (1 - 1-dose 60+ series) Never done    Pneumococcal Vaccines (Age 65+) (1 of 1 - PCV) Never done    COVID-19 Vaccine (1 - 2023-24 season) Never done       Problem List Items Addressed This Visit          Cardiac/Vascular    Mixed hyperlipidemia (Chronic)    Primary hypertension (Chronic)- will get home BP reading     Other Visit Diagnoses       Urinary tract infection with hematuria, site unspecified    -  Primary    Relevant Medications    sulfamethoxazole-trimethoprim 800-160mg (BACTRIM DS) 800-160 mg Tab    Hematuria, unspecified type        Relevant Orders    POCT URINALYSIS W/O SCOPE (Completed)            Health Maintenance Topics with due status: Not Due       Topic Last Completion Date    Lipid Panel 08/23/2023       Future Appointments   Date Time Provider Department Center   7/12/2024  9:00 AM AWV NURSE [facility] MEDICINE [facility]   10/9/2024  9:00 AM Christopher Kelly MD [facility]            Signature:  Christopher Kelly MD  [facility]  MEDICAL GROUP [facility] - FAMILY MEDICINE  [address] [place] [id number]  [phone]    Date of encounter: 5/23/24

## 2024-05-24 ENCOUNTER — TELEPHONE (OUTPATIENT)
Dept: FAMILY MEDICINE | Facility: CLINIC | Age: 69
End: 2024-05-24
Payer: MEDICARE

## 2024-05-24 VITALS — SYSTOLIC BLOOD PRESSURE: 130 MMHG | DIASTOLIC BLOOD PRESSURE: 70 MMHG

## 2024-10-09 ENCOUNTER — OFFICE VISIT (OUTPATIENT)
Dept: FAMILY MEDICINE | Facility: CLINIC | Age: 69
End: 2024-10-09
Payer: MEDICARE

## 2024-10-09 VITALS
TEMPERATURE: 98 F | BODY MASS INDEX: 27.69 KG/M2 | DIASTOLIC BLOOD PRESSURE: 86 MMHG | WEIGHT: 162.19 LBS | HEIGHT: 64 IN | HEART RATE: 65 BPM | OXYGEN SATURATION: 95 % | SYSTOLIC BLOOD PRESSURE: 136 MMHG

## 2024-10-09 DIAGNOSIS — I10 PRIMARY HYPERTENSION: Chronic | ICD-10-CM

## 2024-10-09 DIAGNOSIS — Z53.20 COLONOSCOPY REFUSED: ICD-10-CM

## 2024-10-09 DIAGNOSIS — E78.2 MIXED HYPERLIPIDEMIA: Chronic | ICD-10-CM

## 2024-10-09 DIAGNOSIS — Z13.1 SCREENING FOR DIABETES MELLITUS: ICD-10-CM

## 2024-10-09 DIAGNOSIS — R79.9 ABNORMAL BLOOD CHEMISTRY: Primary | ICD-10-CM

## 2024-10-09 LAB
ANION GAP SERPL CALCULATED.3IONS-SCNC: 7 MMOL/L (ref 7–16)
BASOPHILS # BLD AUTO: 0.04 K/UL (ref 0–0.2)
BASOPHILS NFR BLD AUTO: 0.6 % (ref 0–1)
BUN SERPL-MCNC: 13 MG/DL (ref 7–18)
BUN/CREAT SERPL: 19 (ref 6–20)
CALCIUM SERPL-MCNC: 9.8 MG/DL (ref 8.5–10.1)
CHLORIDE SERPL-SCNC: 104 MMOL/L (ref 98–107)
CHOLEST SERPL-MCNC: 183 MG/DL (ref 0–200)
CHOLEST/HDLC SERPL: 4.2 {RATIO}
CO2 SERPL-SCNC: 30 MMOL/L (ref 21–32)
CREAT SERPL-MCNC: 0.7 MG/DL (ref 0.55–1.02)
DIFFERENTIAL METHOD BLD: ABNORMAL
EGFR (NO RACE VARIABLE) (RUSH/TITUS): 93 ML/MIN/1.73M2
EOSINOPHIL # BLD AUTO: 0.08 K/UL (ref 0–0.5)
EOSINOPHIL NFR BLD AUTO: 1.2 % (ref 1–4)
ERYTHROCYTE [DISTWIDTH] IN BLOOD BY AUTOMATED COUNT: 13.2 % (ref 11.5–14.5)
EST. AVERAGE GLUCOSE BLD GHB EST-MCNC: 111 MG/DL
GLUCOSE SERPL-MCNC: 91 MG/DL (ref 74–106)
HBA1C MFR BLD HPLC: 5.5 % (ref 4.5–6.6)
HCT VFR BLD AUTO: 44.2 % (ref 38–47)
HDLC SERPL-MCNC: 44 MG/DL (ref 40–60)
HGB BLD-MCNC: 14.6 G/DL (ref 12–16)
IMM GRANULOCYTES # BLD AUTO: 0.02 K/UL (ref 0–0.04)
IMM GRANULOCYTES NFR BLD: 0.3 % (ref 0–0.4)
LDLC SERPL CALC-MCNC: 89 MG/DL
LDLC/HDLC SERPL: 2 {RATIO}
LYMPHOCYTES # BLD AUTO: 2.29 K/UL (ref 1–4.8)
LYMPHOCYTES NFR BLD AUTO: 34.2 % (ref 27–41)
MCH RBC QN AUTO: 28 PG (ref 27–31)
MCHC RBC AUTO-ENTMCNC: 33 G/DL (ref 32–36)
MCV RBC AUTO: 84.7 FL (ref 80–96)
MONOCYTES # BLD AUTO: 0.58 K/UL (ref 0–0.8)
MONOCYTES NFR BLD AUTO: 8.7 % (ref 2–6)
MPC BLD CALC-MCNC: 10.8 FL (ref 9.4–12.4)
NEUTROPHILS # BLD AUTO: 3.69 K/UL (ref 1.8–7.7)
NEUTROPHILS NFR BLD AUTO: 55 % (ref 53–65)
NONHDLC SERPL-MCNC: 139 MG/DL
NRBC # BLD AUTO: 0 X10E3/UL
NRBC, AUTO (.00): 0 %
PLATELET # BLD AUTO: 279 K/UL (ref 150–400)
POTASSIUM SERPL-SCNC: 4.4 MMOL/L (ref 3.5–5.1)
RBC # BLD AUTO: 5.22 M/UL (ref 4.2–5.4)
SODIUM SERPL-SCNC: 137 MMOL/L (ref 136–145)
TRIGL SERPL-MCNC: 248 MG/DL (ref 35–150)
VLDLC SERPL-MCNC: 50 MG/DL
WBC # BLD AUTO: 6.7 K/UL (ref 4.5–11)

## 2024-10-09 PROCEDURE — 99214 OFFICE O/P EST MOD 30 MIN: CPT | Mod: ,,, | Performed by: FAMILY MEDICINE

## 2024-10-09 PROCEDURE — 83036 HEMOGLOBIN GLYCOSYLATED A1C: CPT | Mod: GZ,,, | Performed by: CLINICAL MEDICAL LABORATORY

## 2024-10-09 PROCEDURE — 80048 BASIC METABOLIC PNL TOTAL CA: CPT | Mod: ,,, | Performed by: CLINICAL MEDICAL LABORATORY

## 2024-10-09 PROCEDURE — 85025 COMPLETE CBC W/AUTO DIFF WBC: CPT | Mod: ,,, | Performed by: CLINICAL MEDICAL LABORATORY

## 2024-10-09 PROCEDURE — 80061 LIPID PANEL: CPT | Mod: ,,, | Performed by: CLINICAL MEDICAL LABORATORY

## 2024-10-09 RX ORDER — SIMVASTATIN 80 MG/1
80 TABLET, FILM COATED ORAL NIGHTLY
Qty: 90 TABLET | Refills: 3 | Status: SHIPPED | OUTPATIENT
Start: 2024-10-09

## 2024-10-09 RX ORDER — LISINOPRIL 10 MG/1
10 TABLET ORAL DAILY
Qty: 90 TABLET | Refills: 1 | Status: SHIPPED | OUTPATIENT
Start: 2024-10-09

## 2024-10-09 RX ORDER — ATENOLOL 100 MG/1
100 TABLET ORAL DAILY
Qty: 90 TABLET | Refills: 1 | Status: SHIPPED | OUTPATIENT
Start: 2024-10-09

## 2024-10-09 NOTE — PROGRESS NOTES
Christopher Kelly MD   [facility]  MEDICAL GROUP [facility] FAMILY MEDICINE  [address] [place] [address]  [phone]      PATIENT NAME: Kathy Mansfield  : 1955  DATE: 10/9/24  MRN: [id number]      Billing Provider: Christopher Kelly MD  Level of Service: DC OFFICE/OUTPT VISIT, EST, LEVL IV, 30-39 MIN  Patient PCP Information       Provider PCP Type    Christopher Kelly MD General            Reason for Visit / Chief Complaint: Medication Refill       Update PCP  Update Chief Complaint         History of Present Illness / Problem Focused Workflow     Kathy Mansfield presents to the clinic with Medication Refill       Follow up HTN and hyperlipidemia.  Needs meds refilled.  Says that she is doing well and has no new/acute complaints.        Review of Systems     Review of Systems   Constitutional:  Negative for activity change, chills and fever.   HENT:  Negative for sore throat.    Eyes:  Negative for pain.   Respiratory:  Negative for cough, chest tightness and shortness of breath.    Cardiovascular:  Negative for chest pain and palpitations.   Gastrointestinal:  Negative for abdominal pain.   Neurological:  Negative for dizziness, syncope and weakness.   Psychiatric/Behavioral:  Negative for confusion.         Medical / Social / Family History   History reviewed. No pertinent past medical history.    History reviewed. No pertinent surgical history.    Social History    reports that she has never smoked. She has never been exposed to tobacco smoke. She has never used smokeless tobacco.   Social History     Tobacco Use    Smoking status: Never     Passive exposure: Never    Smokeless tobacco: Never       Family History  No family history on file.    Medications and Allergies     Medications  Outpatient Medications Marked as Taking for the 10/9/24 encounter (Office Visit) with Christopher Kelly MD   Medication Sig Dispense Refill    aspirin 325 MG tablet Take 325  mg by mouth once daily.      [DISCONTINUED] atenoloL (TENORMIN) 100 MG tablet Take 1 tablet (100 mg total) by mouth once daily. 90 tablet 1    [DISCONTINUED] lisinopriL 10 MG tablet Take 1 tablet (10 mg total) by mouth once daily. 90 tablet 1    [DISCONTINUED] simvastatin (ZOCOR) 80 MG tablet Take 1 tablet (80 mg total) by mouth nightly. 90 tablet 3       Allergies  Review of patient's allergies indicates:  No Known Allergies    Physical Examination     Vitals:    10/09/24 1033   BP: 136/86   Pulse: 65   Temp: 97.5 °F (36.4 °C)     Physical Exam  Vitals reviewed.   Constitutional:       Appearance: Normal appearance.   HENT:      Head: Normocephalic and atraumatic.   Eyes:      Extraocular Movements: Extraocular movements intact.      Conjunctiva/sclera: Conjunctivae normal.      Pupils: Pupils are equal, round, and reactive to light.   Cardiovascular:      Rate and Rhythm: Normal rate and regular rhythm.      Heart sounds: Normal heart sounds.   Pulmonary:      Effort: Pulmonary effort is normal.      Breath sounds: Normal breath sounds.   Musculoskeletal:         General: Normal range of motion.      Cervical back: Normal range of motion.   Skin:     General: Skin is warm and dry.   Neurological:      General: No focal deficit present.      Mental Status: She is alert and oriented to person, place, and time.   Psychiatric:         Mood and Affect: Mood normal.         Behavior: Behavior normal.          Office Visit on 10/09/2024   Component Date Value Ref Range Status    WBC 10/09/2024 6.70  4.50 - 11.00 K/uL Final    RBC 10/09/2024 5.22  4.20 - 5.40 M/uL Final    Hemoglobin 10/09/2024 14.6  12.0 - 16.0 g/dL Final    Hematocrit 10/09/2024 44.2  38.0 - 47.0 % Final    MCV 10/09/2024 84.7  80.0 - 96.0 fL Final    MCH 10/09/2024 28.0  27.0 - 31.0 pg Final    MCHC 10/09/2024 33.0  32.0 - 36.0 g/dL Final    RDW 10/09/2024 13.2  11.5 - 14.5 % Final    Platelet Count 10/09/2024 279  150 - 400 K/uL Final    MPV  10/09/2024 10.8  9.4 - 12.4 fL Final    Neutrophils % 10/09/2024 55.0  53.0 - 65.0 % Final    Lymphocytes % 10/09/2024 34.2  27.0 - 41.0 % Final    Monocytes % 10/09/2024 8.7 (H)  2.0 - 6.0 % Final    Eosinophils % 10/09/2024 1.2  1.0 - 4.0 % Final    Basophils % 10/09/2024 0.6  0.0 - 1.0 % Final    Immature Granulocytes % 10/09/2024 0.3  0.0 - 0.4 % Final    nRBC, Auto 10/09/2024 0.0  <=0.0 % Final    Neutrophils, Abs 10/09/2024 3.69  1.80 - 7.70 K/uL Final    Lymphocytes, Absolute 10/09/2024 2.29  1.00 - 4.80 K/uL Final    Monocytes, Absolute 10/09/2024 0.58  0.00 - 0.80 K/uL Final    Eosinophils, Absolute 10/09/2024 0.08  0.00 - 0.50 K/uL Final    Basophils, Absolute 10/09/2024 0.04  0.00 - 0.20 K/uL Final    Immature Granulocytes, Absolute 10/09/2024 0.02  0.00 - 0.04 K/uL Final    nRBC, Absolute 10/09/2024 0.00  <=0.00 x10e3/uL Final    Diff Type 10/09/2024 Auto   Final   Office Visit on 05/23/2024   Component Date Value Ref Range Status    Color, UA 05/23/2024 Yellow   Final    Spec Grav UA 05/23/2024 1.010   Final    pH, UA 05/23/2024 6.0   Final    WBC, UA 05/23/2024 small   Final    Nitrite, UA 05/23/2024 neg   Final    Protein, POC 05/23/2024 neg   Final    Glucose, UA 05/23/2024 neg   Final    Ketones, UA 05/23/2024 neg   Final    Bilirubin, POC 05/23/2024 neg   Final    Urobilinogen, UA 05/23/2024 0.2   Final    Blood, UA 05/23/2024 small   Final   Office Visit on 08/23/2023   Component Date Value Ref Range Status    Triglycerides 08/23/2023 234 (H)  35 - 150 mg/dL Final      Normal:  <150 mg/dL  Borderline High: 150-199 mg/dL  High:   200-499 mg/dL  Very High:  >=500    Cholesterol 08/23/2023 195  0 - 200 mg/dL Final      <200 mg/dL:  Desirable  200-240 mg/dL: Borderline High  >240 mg/dL:  High    HDL Cholesterol 08/23/2023 45  40 - 60 mg/dL Final      <40 mg/dL: Low HDL  40-60 mg/dL: Normal  >60 mg/dL: Desirable    Cholesterol/HDL Ratio (Risk Factor) 08/23/2023 4.3   Final    Non-HDL 08/23/2023 150   mg/dL Final    LDL Calculated 08/23/2023 103  mg/dL Final    LDL/HDL 08/23/2023 2.3   Final    VLDL 08/23/2023 47  mg/dL Final    Hepatitis C Ab 08/23/2023 Non-Reactive  Non-Reactive Final         Assessment and Plan (including Health Maintenance)      Problem List  Smart Sets  Document Outside HM   :    Plan: she agrees to get labs done but declines any other preventive screenings as before.  Previous labs reviewed.          Health Maintenance Due   Topic Date Due    TETANUS VACCINE  Never done    Hemoglobin A1c (Diabetic Prevention Screening)  Never done    DEXA Scan  Never done    Colorectal Cancer Screening  Never done    Shingles Vaccine (1 of 2) Never done    RSV Vaccine (Age 60+ and Pregnant patients) (1 - Risk 60-74 years 1-dose series) Never done    Pneumococcal Vaccines (Age 65+) (1 of 1 - PCV) Never done    COVID-19 Vaccine (1 - 2024-25 season) Never done       Problem List Items Addressed This Visit          Cardiac/Vascular    Mixed hyperlipidemia (Chronic)    Relevant Medications    simvastatin (ZOCOR) 80 MG tablet    Other Relevant Orders    Lipid Panel    Primary hypertension (Chronic)    Relevant Medications    lisinopriL 10 MG tablet    atenoloL (TENORMIN) 100 MG tablet    Other Relevant Orders    Basic Metabolic Panel    CBC Auto Differential (Completed)     Other Visit Diagnoses       Abnormal blood chemistry    -  Primary    Relevant Orders    Hemoglobin A1C    Screening for diabetes mellitus        Colonoscopy refused                Health Maintenance Topics with due status: Not Due       Topic Last Completion Date    Lipid Panel 08/23/2023       No future appointments.           Signature:  MD GHAZAL Robb [facility]  MEDICAL GROUP [facility] - FAMILY MEDICINE  [address] [place] [id number]  [phone]    Date of encounter: 10/9/24

## 2024-11-01 ENCOUNTER — CLINICAL SUPPORT (OUTPATIENT)
Dept: FAMILY MEDICINE | Facility: CLINIC | Age: 69
End: 2024-11-01
Payer: MEDICARE

## 2024-11-01 DIAGNOSIS — Z23 IMMUNIZATION DUE: Primary | ICD-10-CM

## 2025-04-10 ENCOUNTER — OFFICE VISIT (OUTPATIENT)
Dept: FAMILY MEDICINE | Facility: CLINIC | Age: 70
End: 2025-04-10
Payer: MEDICARE

## 2025-04-10 VITALS
WEIGHT: 162 LBS | HEART RATE: 64 BPM | BODY MASS INDEX: 27.66 KG/M2 | HEIGHT: 64 IN | OXYGEN SATURATION: 98 % | SYSTOLIC BLOOD PRESSURE: 129 MMHG | DIASTOLIC BLOOD PRESSURE: 66 MMHG

## 2025-04-10 DIAGNOSIS — Z53.20 MAMMOGRAM DECLINED: ICD-10-CM

## 2025-04-10 DIAGNOSIS — I10 PRIMARY HYPERTENSION: Primary | Chronic | ICD-10-CM

## 2025-04-10 DIAGNOSIS — E78.2 MIXED HYPERLIPIDEMIA: Chronic | ICD-10-CM

## 2025-04-10 DIAGNOSIS — Z53.20 COLONOSCOPY REFUSED: ICD-10-CM

## 2025-04-10 PROCEDURE — 99214 OFFICE O/P EST MOD 30 MIN: CPT | Mod: ,,, | Performed by: FAMILY MEDICINE

## 2025-04-10 RX ORDER — SIMVASTATIN 80 MG/1
80 TABLET, FILM COATED ORAL NIGHTLY
Qty: 90 TABLET | Refills: 3 | Status: SHIPPED | OUTPATIENT
Start: 2025-04-10

## 2025-04-10 RX ORDER — LISINOPRIL 10 MG/1
10 TABLET ORAL DAILY
Qty: 90 TABLET | Refills: 1 | Status: SHIPPED | OUTPATIENT
Start: 2025-04-10

## 2025-04-10 RX ORDER — ATENOLOL 100 MG/1
100 TABLET ORAL DAILY
Qty: 90 TABLET | Refills: 1 | Status: SHIPPED | OUTPATIENT
Start: 2025-04-10

## 2025-04-10 NOTE — PROGRESS NOTES
Christopher Kelly MD   [facility]  [facility] [place] - FAMILY MEDICINE  [address] [place] [address] [place] [address]  [phone]      PATIENT NAME: Kathy Mansfield  : 1955  DATE: 4/10/25  MRN: [id number]      Billing Provider: Christopher Kelly MD  Level of Service: NE OFFICE/OUTPT VISIT, EST, LEVL IV, 30-39 MIN  Patient PCP Information       Provider PCP Type    Christopher Kelly MD General            Reason for Visit / Chief Complaint: Medication Refill and Health Maintenance (TETANUS VACCINE Never done/Mammogram Never donedeclined/DEXA Scan Never done/Colorectal Cancer Screening Never donedeclined/Shingles Vaccine(1 of 2) Never done/Pneumococcal Vaccines (Age 50+)(1 of 1 - PCV) Never donedeclined/RSV Vaccine (Age 60+ and Pregnant patients)(1 - Risk 60-74 years 1-dose series) Never done/COVID-19 Vaccine( season) Never donedecined/)       Update PCP  Update Chief Complaint         History of Present Illness / Problem Focused Workflow     Kathy Mansfield presents to the clinic with Medication Refill and Health Maintenance (TETANUS VACCINE Never done/Mammogram Never donedeclined/DEXA Scan Never done/Colorectal Cancer Screening Never donedeclined/Shingles Vaccine(1 of 2) Never done/Pneumococcal Vaccines (Age 50+)(1 of 1 - PCV) Never donedeclined/RSV Vaccine (Age 60+ and Pregnant patients)(1 - Risk 60-74 years 1-dose series) Never done/COVID-19 Vaccine( season) Never donedecined/)       Follow up HTN and HLD.  She reports that she is doing well and has no new/acute complaints.  BP is well controlled.  She again declines to be scheduled for colonoscopy or mammogram.  Will get labs done at next clinic visit.        Review of Systems     Review of Systems   Constitutional:  Negative for activity change, chills and fever.   HENT:  Negative for sore throat.    Eyes:  Negative for pain.   Respiratory:  Negative for cough, chest tightness and shortness of breath.     Cardiovascular:  Negative for chest pain and palpitations.   Gastrointestinal:  Negative for abdominal pain.   Neurological:  Negative for dizziness, syncope and weakness.   Psychiatric/Behavioral:  Negative for confusion.         Medical / Social / Family History   History reviewed. No pertinent past medical history.    History reviewed. No pertinent surgical history.    Social History    reports that she has never smoked. She has never been exposed to tobacco smoke. She has never used smokeless tobacco.   Social History[1]    Family History  No family history on file.    Medications and Allergies     Medications  Outpatient Medications Marked as Taking for the 4/10/25 encounter (Office Visit) with Christopher Kelly MD   Medication Sig Dispense Refill    aspirin 325 MG tablet Take 325 mg by mouth once daily.      [DISCONTINUED] atenoloL (TENORMIN) 100 MG tablet Take 1 tablet (100 mg total) by mouth once daily. 90 tablet 1    [DISCONTINUED] lisinopriL 10 MG tablet Take 1 tablet (10 mg total) by mouth once daily. 90 tablet 1    [DISCONTINUED] simvastatin (ZOCOR) 80 MG tablet Take 1 tablet (80 mg total) by mouth nightly. 90 tablet 3       Allergies  Review of patient's allergies indicates:  No Known Allergies    Physical Examination     Vitals:    04/10/25 1023   BP: 129/66   Pulse: 64     Physical Exam  Vitals reviewed.   Constitutional:       Appearance: Normal appearance.   HENT:      Head: Normocephalic and atraumatic.   Eyes:      Extraocular Movements: Extraocular movements intact.      Conjunctiva/sclera: Conjunctivae normal.      Pupils: Pupils are equal, round, and reactive to light.   Cardiovascular:      Rate and Rhythm: Normal rate and regular rhythm.      Heart sounds: Normal heart sounds.   Pulmonary:      Effort: Pulmonary effort is normal.      Breath sounds: Normal breath sounds.   Musculoskeletal:         General: Normal range of motion.      Cervical back: Normal range of motion.   Skin:      General: Skin is warm and dry.   Neurological:      General: No focal deficit present.      Mental Status: She is alert and oriented to person, place, and time.   Psychiatric:         Mood and Affect: Mood normal.         Behavior: Behavior normal.          No visits with results within 6 Month(s) from this visit.   Latest known visit with results is:   Office Visit on 10/09/2024   Component Date Value Ref Range Status    WBC 10/09/2024 6.70  4.50 - 11.00 K/uL Final    RBC 10/09/2024 5.22  4.20 - 5.40 M/uL Final    Hemoglobin 10/09/2024 14.6  12.0 - 16.0 g/dL Final    Hematocrit 10/09/2024 44.2  38.0 - 47.0 % Final    MCV 10/09/2024 84.7  80.0 - 96.0 fL Final    MCH 10/09/2024 28.0  27.0 - 31.0 pg Final    MCHC 10/09/2024 33.0  32.0 - 36.0 g/dL Final    RDW 10/09/2024 13.2  11.5 - 14.5 % Final    Platelet Count 10/09/2024 279  150 - 400 K/uL Final    MPV 10/09/2024 10.8  9.4 - 12.4 fL Final    Neutrophils % 10/09/2024 55.0  53.0 - 65.0 % Final    Lymphocytes % 10/09/2024 34.2  27.0 - 41.0 % Final    Monocytes % 10/09/2024 8.7 (H)  2.0 - 6.0 % Final    Eosinophils % 10/09/2024 1.2  1.0 - 4.0 % Final    Basophils % 10/09/2024 0.6  0.0 - 1.0 % Final    Immature Granulocytes % 10/09/2024 0.3  0.0 - 0.4 % Final    nRBC, Auto 10/09/2024 0.0  <=0.0 % Final    Neutrophils, Abs 10/09/2024 3.69  1.80 - 7.70 K/uL Final    Lymphocytes, Absolute 10/09/2024 2.29  1.00 - 4.80 K/uL Final    Monocytes, Absolute 10/09/2024 0.58  0.00 - 0.80 K/uL Final    Eosinophils, Absolute 10/09/2024 0.08  0.00 - 0.50 K/uL Final    Basophils, Absolute 10/09/2024 0.04  0.00 - 0.20 K/uL Final    Immature Granulocytes, Absolute 10/09/2024 0.02  0.00 - 0.04 K/uL Final    nRBC, Absolute 10/09/2024 0.00  <=0.00 x10e3/uL Final    Diff Type 10/09/2024 Auto   Final    Hemoglobin A1C 10/09/2024 5.5  4.5 - 6.6 % Final      Normal:               <5.7%  Pre-Diabetic:       5.7% to 6.4%  Diabetic:             >6.4%  Diabetic Goal:     <7%    Estimated Average  Glucose 10/09/2024 111  mg/dL Final    Sodium 10/09/2024 137  136 - 145 mmol/L Final    Potassium 10/09/2024 4.4  3.5 - 5.1 mmol/L Final    Chloride 10/09/2024 104  98 - 107 mmol/L Final    CO2 10/09/2024 30  21 - 32 mmol/L Final    Anion Gap 10/09/2024 7  7 - 16 mmol/L Final    Glucose 10/09/2024 91  74 - 106 mg/dL Final    BUN 10/09/2024 13  7 - 18 mg/dL Final    Creatinine 10/09/2024 0.70  0.55 - 1.02 mg/dL Final    BUN/Creatinine Ratio 10/09/2024 19  6 - 20 Final    Calcium 10/09/2024 9.8  8.5 - 10.1 mg/dL Final    eGFR 10/09/2024 93  >=60 mL/min/1.73m2 Final    Triglycerides 10/09/2024 248 (H)  35 - 150 mg/dL Final      Normal:  <150 mg/dL  Borderline High: 150-199 mg/dL  High:   200-499 mg/dL  Very High:  >=500    Cholesterol 10/09/2024 183  0 - 200 mg/dL Final      <200 mg/dL:  Desirable  200-240 mg/dL: Borderline High  >240 mg/dL:  High    HDL Cholesterol 10/09/2024 44  40 - 60 mg/dL Final      <40 mg/dL: Low HDL  40-60 mg/dL: Normal  >60 mg/dL: Desirable    Cholesterol/HDL Ratio (Risk Factor) 10/09/2024 4.2   Final    Non-HDL 10/09/2024 139  mg/dL Final    LDL Calculated 10/09/2024 89  mg/dL Final    Unable to calculate due to one of the following values:  Cholesterol <5  HDL Cholesterol <5  Triglycerides <10 or >400    LDL/HDL 10/09/2024 2.0   Final    Unable to calculate due to one of the following values:  Cholesterol <5  HDL Cholesterol <5  Triglycerides <10 or >400    VLDL 10/09/2024 50  mg/dL Final         Assessment and Plan (including Health Maintenance)      Problem List  Smart Sets  Document Outside HM   :    Plan: PATIENT DOES NOT WANT TO ADDRESS ANY OTHER CARE GAPS AT THIS TIME        Health Maintenance Due   Topic Date Due    TETANUS VACCINE  Never done    Mammogram  Never done    DEXA Scan  Never done    Colorectal Cancer Screening  Never done    Shingles Vaccine (1 of 2) Never done    Pneumococcal Vaccines (Age 50+) (1 of 1 - PCV) Never done    RSV Vaccine (Age 60+ and Pregnant patients) (1 -  Risk 60-74 years 1-dose series) Never done    COVID-19 Vaccine (1 - 2024-25 season) Never done       Problem List Items Addressed This Visit          Cardiac/Vascular    Mixed hyperlipidemia (Chronic)    Relevant Medications    simvastatin (ZOCOR) 80 MG tablet    Primary hypertension - Primary (Chronic)    Relevant Medications    atenoloL (TENORMIN) 100 MG tablet    lisinopriL 10 MG tablet     Other Visit Diagnoses         Colonoscopy refused          Mammogram declined                Health Maintenance Topics with due status: Not Due       Topic Last Completion Date    Hemoglobin A1c (Diabetic Prevention Screening) 10/09/2024    Lipid Panel 10/09/2024       Future Appointments   Date Time Provider Department Center   10/10/2025  1:00 PM [facility]            Signature:  MD GHAZAL Robb [facility]  MEDICAL GROUP OF [place] - FAMILY MEDICINE  [address] [place] [id number]  [phone]    Date of encounter: 4/10/25         [1]   Social History  Tobacco Use    Smoking status: Never     Passive exposure: Never    Smokeless tobacco: Never

## 2025-06-05 ENCOUNTER — OFFICE VISIT (OUTPATIENT)
Dept: FAMILY MEDICINE | Facility: CLINIC | Age: 70
End: 2025-06-05

## 2025-06-05 VITALS
SYSTOLIC BLOOD PRESSURE: 135 MMHG | BODY MASS INDEX: 27.83 KG/M2 | HEART RATE: 70 BPM | HEIGHT: 64 IN | DIASTOLIC BLOOD PRESSURE: 60 MMHG | WEIGHT: 163 LBS

## 2025-06-05 DIAGNOSIS — N30.90 CYSTITIS: ICD-10-CM

## 2025-06-05 DIAGNOSIS — R30.0 DYSURIA: Primary | ICD-10-CM

## 2025-06-05 LAB
BILIRUB SERPL-MCNC: NEGATIVE MG/DL
BLOOD URINE, POC: ABNORMAL
CLARITY, UA: CLEAR
COLOR, UA: YELLOW
GLUCOSE UR QL STRIP: NEGATIVE
KETONES UR QL STRIP: NEGATIVE
LEUKOCYTE ESTERASE URINE, POC: ABNORMAL
NITRITE, POC UA: NEGATIVE
PH, POC UA: 7.5
PROTEIN, POC: NEGATIVE
SPECIFIC GRAVITY, POC UA: 1.02
UROBILINOGEN, POC UA: 0.2

## 2025-06-05 PROCEDURE — 99213 OFFICE O/P EST LOW 20 MIN: CPT | Mod: ,,,

## 2025-06-05 RX ORDER — SULFAMETHOXAZOLE AND TRIMETHOPRIM 800; 160 MG/1; MG/1
1 TABLET ORAL 2 TIMES DAILY
Qty: 14 TABLET | Refills: 0 | Status: SHIPPED | OUTPATIENT
Start: 2025-06-05

## 2025-07-03 NOTE — PROGRESS NOTES
"Subjective     Patient ID: Kathy Mansfield is a 69 y.o. female.    Chief Complaint: Urinary Tract Infection (Pt states she thinks she has a bladder infection) and Health Maintenance (TETANUS VACCINE Never done/Mammogram Never done/DEXA Scan Never done/Colorectal Cancer Screening Never done/Shingles Vaccine(1 of 2) Never done/Pneumococcal Vaccines (Age 50+)(1 of 1 - PCV) Never done/RSV Vaccine (Age 60+ and Pregnant patients)(1 - Risk 60-74 years 1-dose series) Never done/COVID-19 Vaccine(4 - 2024-25 season) due on 09/01/2024/Pt will address with PCP)    History of Present Illness    CHIEF COMPLAINT:  Patient presents today for urinary symptoms concerning for urinary tract infection    HISTORY OF PRESENT ILLNESS:  She reports onset of urinary symptoms since Monday with lower abdominal discomfort. She denies burning with urination, blood in urine, fever, nausea, and vomiting. She has a history of urinary tract infections, notably occurring during Memorial Day weekends in the past two years, previously treated by Dr. Kelly.            Review of Systems   Constitutional:  Positive for fever. Negative for fatigue and unexpected weight change.   Respiratory:  Negative for cough, chest tightness, shortness of breath and wheezing.    Cardiovascular:  Negative for chest pain, palpitations and leg swelling.   Gastrointestinal:  Positive for nausea and vomiting. Negative for abdominal pain, blood in stool, constipation and diarrhea.   Genitourinary:  Positive for difficulty urinating, dysuria, frequency and urgency.   Skin:  Negative for color change and rash.   Neurological:  Positive for headaches. Negative for dizziness, weakness and light-headedness.   Hematological:  Does not bruise/bleed easily.         Vital Signs  Pulse: 70  BP: 135/60  Pain Score: 0-No pain  Height and Weight  Height: 5' 4" (162.6 cm)  Weight: 73.9 kg (163 lb)  BSA (Calculated - sq m): 1.83 sq meters  BMI (Calculated): 28  Weight in (lb) to have " BMI = 25: 145.3]    Physical Exam  Vitals and nursing note reviewed.   Constitutional:       Appearance: Normal appearance. She is normal weight.   Cardiovascular:      Rate and Rhythm: Normal rate and regular rhythm.      Pulses: Normal pulses.      Heart sounds: Normal heart sounds.   Pulmonary:      Effort: Pulmonary effort is normal.      Breath sounds: Normal breath sounds.   Abdominal:      General: Abdomen is flat. Bowel sounds are normal.      Palpations: Abdomen is soft.      Tenderness: There is no right CVA tenderness or left CVA tenderness.   Musculoskeletal:         General: Normal range of motion.   Skin:     General: Skin is warm and dry.      Capillary Refill: Capillary refill takes less than 2 seconds.   Neurological:      General: No focal deficit present.      Mental Status: She is alert and oriented to person, place, and time.   Psychiatric:         Mood and Affect: Mood normal.         Behavior: Behavior normal.         Thought Content: Thought content normal.         Judgment: Judgment normal.            Assessment and Plan     1. Dysuria  -     POCT URINALYSIS W/O SCOPE    2. Cystitis  -     sulfamethoxazole-trimethoprim 800-160mg (BACTRIM DS) 800-160 mg Tab; Take 1 tablet by mouth 2 (two) times daily.  Dispense: 14 tablet; Refill: 0      Assessment & Plan    N39.0 Urinary tract infection, site not specified  R10.30 Lower abdominal pain, unspecified  Z87.440 Personal history of urinary (tract) infections    URINARY TRACT INFECTION:  - Suspect UTI based on reported symptoms of discomfort and frequent urination since Monday.  - Patient denies burning sensation, nausea, vomiting, fever, or blood in urine.  - Ordered urinalysis to evaluate for UTI.    LOWER ABDOMINAL PAIN:  - Patient reports discomfort in lower abdomen with no costovertebral angle tenderness and no associated diarrhea.    HISTORY OF URINARY TRACT INFECTIONS:  - Noted the patient has a history of UTIs occurring around Memorial Day  weekend in previous years.    FOLLOW-UP:  - Follow up to discuss urinalysis results and determine treatment plan.       Abx with food, increase water. Take medication until completely gone and with food            Follow up if symptoms worsen or fail to improve.    This note was generated with the assistance of ambient listening technology. Verbal consent was obtained by the patient and accompanying visitor(s) for the recording of patient appointment to facilitate this note. I attest to having reviewed and edited the generated note for accuracy, though some syntax or spelling errors may persist. Please contact the author of this note for any clarification.         I spent a total of 20 minutes on the day of the visit.This includes face to face time and non-face to face time preparing to see the patient (eg, review of tests), obtaining and/or reviewing separately obtained history, documenting clinical information in the electronic or other health record, independently interpreting results and communicating results to the patient/family/caregiver, or care coordinator.    JULIUS Hart